# Patient Record
Sex: FEMALE | URBAN - METROPOLITAN AREA
[De-identification: names, ages, dates, MRNs, and addresses within clinical notes are randomized per-mention and may not be internally consistent; named-entity substitution may affect disease eponyms.]

---

## 2023-05-25 ENCOUNTER — OFFICE VISIT (OUTPATIENT)
Dept: URBAN - METROPOLITAN AREA CLINIC 63 | Facility: CLINIC | Age: 38
End: 2023-05-25
Payer: COMMERCIAL

## 2023-05-25 ENCOUNTER — DASHBOARD ENCOUNTERS (OUTPATIENT)
Age: 38
End: 2023-05-25

## 2023-05-25 VITALS
BODY MASS INDEX: 19.78 KG/M2 | TEMPERATURE: 98.4 F | OXYGEN SATURATION: 100 % | DIASTOLIC BLOOD PRESSURE: 78 MMHG | HEIGHT: 67 IN | HEART RATE: 97 BPM | WEIGHT: 126 LBS | SYSTOLIC BLOOD PRESSURE: 114 MMHG

## 2023-05-25 DIAGNOSIS — R10.10 UPPER ABDOMINAL PAIN: ICD-10-CM

## 2023-05-25 DIAGNOSIS — R10.13 DYSPEPSIA: ICD-10-CM

## 2023-05-25 PROCEDURE — 99203 OFFICE O/P NEW LOW 30 MIN: CPT | Performed by: NURSE PRACTITIONER

## 2023-05-25 RX ORDER — PANTOPRAZOLE SODIUM 20 MG/1
1 TABLET TABLET, DELAYED RELEASE ORAL ONCE A DAY
Qty: 30 | Refills: 2 | OUTPATIENT
Start: 2023-05-25

## 2023-05-25 NOTE — HPI-PREVIOUS IMAGING
CT abdomen/pelvis with contrast 05/06/2023 Impression: 1.  No evidence of acute intra-abdominal process. 2.  Mild sigmoid colon diverticulosis

## 2023-05-25 NOTE — PHYSICAL EXAM GASTROINTESTINAL
Abdomen, soft, epigastric tenderness, nondistended, no guarding or rigidity, no masses palpable, normal bowel sounds ,

## 2023-05-25 NOTE — HPI-TODAY'S VISIT:
Ms. Sanchez is a pleasant 38-year-old female evaluated as a new patient in follow-up of hospitalization.  She was evaluated in the ED 05/06/2023 with complaint of abdominal pain x3 days.  She complained of RLQ abdominal pain, nausea, diarrhea. She was given IV fluids, CT was negative for appendicitis.  Advised to have pelvic ultrasound however patient deferred.  Was offered Bentyl but refused. Today, she complains of RLQ abdominal disomfort onset 05/04/2023, associated with nausea.  Complains of acid reflux, ,sour stomach, loss of appetite.  Having BM every 2-3 days, varying consistency.  Was having diarrhea at that time.  Finished 10 days of augmentin.   Continues probiotic.   Drinking increased amount of water.  Admits being under increased stress.

## 2023-06-01 ENCOUNTER — LAB OUTSIDE AN ENCOUNTER (OUTPATIENT)
Dept: URBAN - METROPOLITAN AREA CLINIC 63 | Facility: CLINIC | Age: 38
End: 2023-06-01

## 2023-07-11 ENCOUNTER — OFFICE VISIT (OUTPATIENT)
Dept: URBAN - METROPOLITAN AREA CLINIC 9 | Facility: CLINIC | Age: 38
End: 2023-07-11

## 2023-08-07 ENCOUNTER — OFFICE VISIT (OUTPATIENT)
Dept: URBAN - METROPOLITAN AREA MEDICAL CENTER 14 | Facility: MEDICAL CENTER | Age: 38
End: 2023-08-07

## 2023-08-22 ENCOUNTER — OFFICE VISIT (OUTPATIENT)
Dept: URBAN - METROPOLITAN AREA CLINIC 63 | Facility: CLINIC | Age: 38
End: 2023-08-22